# Patient Record
Sex: MALE | URBAN - METROPOLITAN AREA
[De-identification: names, ages, dates, MRNs, and addresses within clinical notes are randomized per-mention and may not be internally consistent; named-entity substitution may affect disease eponyms.]

---

## 2022-04-14 ENCOUNTER — OFFICE VISIT (OUTPATIENT)
Dept: URBAN - METROPOLITAN AREA CLINIC 27 | Facility: CLINIC | Age: 52
End: 2022-04-14
Payer: OTHER GOVERNMENT

## 2022-04-14 DIAGNOSIS — H43.11 VITREOUS HEMORRHAGE, RIGHT EYE: Primary | ICD-10-CM

## 2022-04-14 DIAGNOSIS — H25.043 POSTERIOR SUBCAPSULAR POLAR AGE-RELATED CATARACT, BILATERAL: ICD-10-CM

## 2022-04-14 DIAGNOSIS — E11.3593 TYPE 2 DIAB WITH PROLIF DIAB RTNOP WITHOUT MACULAR EDEMA, BI: ICD-10-CM

## 2022-04-14 PROCEDURE — 92134 CPTRZ OPH DX IMG PST SGM RTA: CPT | Performed by: OPHTHALMOLOGY

## 2022-04-14 PROCEDURE — 99204 OFFICE O/P NEW MOD 45 MIN: CPT | Performed by: OPHTHALMOLOGY

## 2022-04-14 NOTE — IMPRESSION/PLAN
Impression: Vitreous hemorrhage, right eye: H43.11.  Plan: --exam confirms VH OD secondary to PDR
--findings/diagnosis d/w patient in detail
--options discussed, including observation, anti-VEGF, and PPV 
--pt elects intravitreal Avastin, performed without complication
--discussed upright positioning, elevated HOB while sleeping
--will consider PPV if VH fails to clear on its own
--if VH resolves sufficiently at f/u visit, proceed with PRP

RTC 1 month for DFE/OCT OU re-eval

## 2022-04-14 NOTE — IMPRESSION/PLAN
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 Plan: --exam/OCT reveal active PDR without DME OU
--r/b/a of anti-VEGF, PRP, observation discussed --pt understands Avastin is considered off label for intraocular use --pt elects to proceed with Avastin OU, performed w/o complication
--will require PRP OU at a later date --importance of tight BS/BP/lipid control discussed
--coordinate care with PCP to reduce systemic complications of DM

## 2022-05-19 ENCOUNTER — OFFICE VISIT (OUTPATIENT)
Dept: URBAN - METROPOLITAN AREA CLINIC 27 | Facility: CLINIC | Age: 52
End: 2022-05-19
Payer: OTHER GOVERNMENT

## 2022-05-19 DIAGNOSIS — H25.043 POSTERIOR SUBCAPSULAR POLAR AGE-RELATED CATARACT, BILATERAL: ICD-10-CM

## 2022-05-19 DIAGNOSIS — H43.11 VITREOUS HEMORRHAGE, RIGHT EYE: Primary | ICD-10-CM

## 2022-05-19 DIAGNOSIS — E11.3593 TYPE 2 DIABETES MELLITUS WITH PROLIFERATIVE DIABETIC RETINOPATHY WITHOUT MACULAR EDEMA, BILATERAL: ICD-10-CM

## 2022-05-19 PROCEDURE — 99213 OFFICE O/P EST LOW 20 MIN: CPT | Performed by: OPHTHALMOLOGY

## 2022-05-19 PROCEDURE — 92134 CPTRZ OPH DX IMG PST SGM RTA: CPT | Performed by: OPHTHALMOLOGY

## 2022-05-19 NOTE — IMPRESSION/PLAN
Impression: Vitreous hemorrhage, right eye: H43.11.
s/p Avastin OU x1, last 4/14/22 Plan: --exam confirms improving VH OD s/p Avastin
--findings/diagnosis d/w patient in detail
--options discussed, including observation, anti-VEGF, and PPV 
--pt elects intravitreal Avastin #2, performed without complication
--discussed upright positioning, elevated HOB while sleeping
--will consider PPV if VH fails to clear on its own
--if VH resolves sufficiently at f/u visit, proceed with PRP

RTC 1 month for DFE/OCT OU re-eval

## 2022-05-19 NOTE — IMPRESSION/PLAN
Impression: Type 2 diab with prolif diab rtnop without macular edema, bi: [id number]. s/p Avastin OU x 1, last 4/14/22 Plan: --exam/OCT reveal stable PDR without DME OU
--r/b/a of anti-VEGF, PRP, observation discussed --pt understands Avastin is considered off label for intraocular use --pt elects to proceed with Avastin OU #2, performed w/o complication
--will require PRP OU at a later date --importance of tight BS/BP/lipid control discussed
--coordinate care with PCP to reduce systemic complications of DM

## 2022-06-21 ENCOUNTER — OFFICE VISIT (OUTPATIENT)
Dept: URBAN - METROPOLITAN AREA CLINIC 13 | Facility: CLINIC | Age: 52
End: 2022-06-21
Payer: OTHER GOVERNMENT

## 2022-06-21 DIAGNOSIS — H25.043 POSTERIOR SUBCAPSULAR POLAR AGE-RELATED CATARACT, BILATERAL: ICD-10-CM

## 2022-06-21 DIAGNOSIS — H43.11 VITREOUS HEMORRHAGE, RIGHT EYE: Primary | ICD-10-CM

## 2022-06-21 DIAGNOSIS — E11.3593 TYPE 2 DIAB WITH PROLIF DIAB RTNOP WITHOUT MACULAR EDEMA, BI: ICD-10-CM

## 2022-06-21 PROCEDURE — 92134 CPTRZ OPH DX IMG PST SGM RTA: CPT | Performed by: OPHTHALMOLOGY

## 2022-06-21 PROCEDURE — 99213 OFFICE O/P EST LOW 20 MIN: CPT | Performed by: OPHTHALMOLOGY

## 2022-06-21 RX ORDER — PROPYLENE GLYCOL 0.06 MG/ML
0.6 % EMULSION OPHTHALMIC
Qty: 5 | Refills: 3 | Status: INACTIVE
Start: 2022-06-21 | End: 2022-09-18

## 2022-06-21 ASSESSMENT — INTRAOCULAR PRESSURE
OS: 12
OD: 9

## 2022-06-21 NOTE — IMPRESSION/PLAN
Impression: Type 2 diab with prolif diab rtnop without macular edema, bi: [id number]. s/p Avastin OU x 2, last 05/19/22 Plan: --exam/OCT reveal stable PDR without DME OU
--r/b/a of anti-VEGF, PRP, observation discussed --pt understands Avastin is considered off label for intraocular use --pt elects to proceed with Avastin OU #3, performed w/o complication
--will require PRP OU at a later date --importance of tight BS/BP/lipid control discussed
--coordinate care with PCP to reduce systemic complications of DM

## 2022-06-21 NOTE — IMPRESSION/PLAN
Impression: Vitreous hemorrhage, right eye: H43.11.
s/p Avastin OU x 2, last 05/19/22 Plan: --exam confirms improving VH OD s/p Avastin x 2
--findings/diagnosis d/w patient in detail
--options discussed, including observation, anti-VEGF, and PPV 
--pt elects intravitreal Avastin #3, performed without complication
--discussed upright positioning, elevated HOB while sleeping
--will consider PPV if VH fails to clear on its own
--if VH resolves sufficiently at f/u visit, proceed with PRP

RTC 1 month for PRP OD
then schedule PRP OS

## 2022-07-19 ENCOUNTER — PROCEDURE (OUTPATIENT)
Dept: URBAN - METROPOLITAN AREA CLINIC 13 | Facility: CLINIC | Age: 52
End: 2022-07-19
Payer: OTHER GOVERNMENT

## 2022-07-19 DIAGNOSIS — E11.3593 TYPE 2 DIABETES MELLITUS WITH PROLIFERATIVE DIABETIC RETINOPATHY WITHOUT MACULAR EDEMA, BILATERAL: Primary | ICD-10-CM

## 2022-07-19 PROCEDURE — 67228 TREATMENT X10SV RETINOPATHY: CPT | Performed by: OPHTHALMOLOGY

## 2022-07-19 ASSESSMENT — INTRAOCULAR PRESSURE
OS: 13
OD: 12

## 2022-09-23 ENCOUNTER — PROCEDURE (OUTPATIENT)
Dept: URBAN - METROPOLITAN AREA CLINIC 7 | Facility: CLINIC | Age: 52
End: 2022-09-23
Payer: OTHER GOVERNMENT

## 2022-09-23 DIAGNOSIS — H25.043 POSTERIOR SUBCAPSULAR POLAR AGE-RELATED CATARACT, BILATERAL: ICD-10-CM

## 2022-09-23 DIAGNOSIS — H43.11 VITREOUS HEMORRHAGE, RIGHT EYE: Primary | ICD-10-CM

## 2022-09-23 DIAGNOSIS — E11.3593 TYPE 2 DIAB WITH PROLIF DIAB RTNOP WITHOUT MACULAR EDEMA, BI: ICD-10-CM

## 2022-09-23 PROCEDURE — 99213 OFFICE O/P EST LOW 20 MIN: CPT | Performed by: OPHTHALMOLOGY

## 2022-09-23 PROCEDURE — 92134 CPTRZ OPH DX IMG PST SGM RTA: CPT | Performed by: OPHTHALMOLOGY

## 2022-09-23 ASSESSMENT — INTRAOCULAR PRESSURE
OD: 9
OS: 14

## 2022-09-23 NOTE — IMPRESSION/PLAN
Impression: Type 2 diab with prolif diab rtnop without macular edema, bi: [id number]. 
s/p Avastin OU x 3, last 06/21/22 Plan: --exam/OCT reveal stable PDR without DME OU
--s/p PRP OD (see above), refuses PRP OS
--importance of tight BS/BP/lipid control discussed
--coordinate care with PCP to reduce systemic complications of DM

## 2022-09-23 NOTE — IMPRESSION/PLAN
Impression: Vitreous hemorrhage, right eye: H43.11.
s/p Avastin OU x 3, last 06/21/22
s/p PRP OD 07/19/22 Plan: --exam confirms improving VH OD s/p Avastin x 3 and PRP
--unfortunately [facility] has dropped to bare LP (confirmed by SM)
--concern for possible optic nerve compromise --no disc edema or hemorrhage seen on exam
--recommend MRI brain and orbits with optic nerve cuts --pt refuses the MRI or any additional treatment at this time --pt elects to follow-up with his regular eye doctor RTC PRN